# Patient Record
Sex: FEMALE | Race: ASIAN | URBAN - METROPOLITAN AREA
[De-identification: names, ages, dates, MRNs, and addresses within clinical notes are randomized per-mention and may not be internally consistent; named-entity substitution may affect disease eponyms.]

---

## 2023-01-03 ENCOUNTER — OFFICE VISIT (OUTPATIENT)
Dept: INTERNAL MEDICINE CLINIC | Facility: CLINIC | Age: 58
End: 2023-01-03
Payer: MEDICAID

## 2023-01-03 VITALS
BODY MASS INDEX: 19.39 KG/M2 | HEIGHT: 61 IN | OXYGEN SATURATION: 99 % | DIASTOLIC BLOOD PRESSURE: 80 MMHG | WEIGHT: 102.69 LBS | HEART RATE: 128 BPM | SYSTOLIC BLOOD PRESSURE: 140 MMHG

## 2023-01-03 DIAGNOSIS — I10 ESSENTIAL HYPERTENSION: Primary | ICD-10-CM

## 2023-01-03 DIAGNOSIS — Z12.11 ENCOUNTER FOR FIT (FECAL IMMUNOCHEMICAL TEST) SCREENING: ICD-10-CM

## 2023-01-03 PROCEDURE — 3008F BODY MASS INDEX DOCD: CPT | Performed by: NURSE PRACTITIONER

## 2023-01-03 PROCEDURE — 3077F SYST BP >= 140 MM HG: CPT | Performed by: NURSE PRACTITIONER

## 2023-01-03 PROCEDURE — 99204 OFFICE O/P NEW MOD 45 MIN: CPT | Performed by: NURSE PRACTITIONER

## 2023-01-03 PROCEDURE — 3079F DIAST BP 80-89 MM HG: CPT | Performed by: NURSE PRACTITIONER

## 2023-01-03 RX ORDER — FELODIPINE 2.5 MG/1
2.5 TABLET, EXTENDED RELEASE ORAL DAILY
COMMUNITY
End: 2023-01-03

## 2023-01-03 RX ORDER — FELODIPINE 2.5 MG/1
2.5 TABLET, EXTENDED RELEASE ORAL DAILY
Qty: 30 TABLET | Refills: 0 | Status: SHIPPED | OUTPATIENT
Start: 2023-01-03

## 2023-01-03 RX ORDER — RAMIPRIL 10 MG/1
10 CAPSULE ORAL DAILY
COMMUNITY
End: 2023-01-03

## 2023-01-03 RX ORDER — RAMIPRIL 10 MG/1
10 CAPSULE ORAL DAILY
Qty: 30 CAPSULE | Refills: 0 | Status: SHIPPED | OUTPATIENT
Start: 2023-01-03

## 2023-01-03 NOTE — PATIENT INSTRUCTIONS
ASSESSMENT/PLAN:   Essential hypertension  (primary encounter diagnosis)  Careful with diet and excercise at least 30 minutes 3-4 times a week. Check blood pressures at different times on different days. Can purchase own blood pressure monitor. If not, check at local pharmacy. Bake foods more and grill occasionally. Avoid fried foods. No salt. Use other seasonings. ? anxiety  Call in 1 week with blood pressure and Heart rate readings      Encounter for fit (fecal immunochemical test) screening  Ordered fit test    Orders Placed This Encounter      Occult Blood, Fecal, FIT Immunoassay    Follow up with primary in 1 month or sooner if needed  Meds This Visit:  Requested Prescriptions     Signed Prescriptions Disp Refills    felodipine ER 2.5 MG Oral Tablet 24 Hr 30 tablet 0     Sig: Take 1 tablet (2.5 mg total) by mouth daily. ramipril 10 MG Oral Cap 30 capsule 0     Sig: Take 1 capsule (10 mg total) by mouth daily.        Imaging & Referrals:  None

## 2023-01-20 ENCOUNTER — LAB ENCOUNTER (OUTPATIENT)
Dept: LAB | Age: 58
End: 2023-01-20
Attending: INTERNAL MEDICINE
Payer: MEDICAID

## 2023-01-20 ENCOUNTER — OFFICE VISIT (OUTPATIENT)
Dept: INTERNAL MEDICINE CLINIC | Facility: CLINIC | Age: 58
End: 2023-01-20

## 2023-01-20 VITALS
SYSTOLIC BLOOD PRESSURE: 142 MMHG | DIASTOLIC BLOOD PRESSURE: 80 MMHG | BODY MASS INDEX: 18.31 KG/M2 | HEART RATE: 130 BPM | HEIGHT: 61 IN | WEIGHT: 97 LBS

## 2023-01-20 DIAGNOSIS — Z12.31 ENCOUNTER FOR SCREENING MAMMOGRAM FOR BREAST CANCER: ICD-10-CM

## 2023-01-20 DIAGNOSIS — Z00.00 ANNUAL PHYSICAL EXAM: Primary | ICD-10-CM

## 2023-01-20 DIAGNOSIS — Z00.00 ANNUAL PHYSICAL EXAM: ICD-10-CM

## 2023-01-20 DIAGNOSIS — Z53.20 PAPANICOLAOU SMEAR DECLINED: ICD-10-CM

## 2023-01-20 DIAGNOSIS — I10 WHITE COAT SYNDROME WITH HYPERTENSION: ICD-10-CM

## 2023-01-20 DIAGNOSIS — R74.8 ELEVATED ALKALINE PHOSPHATASE LEVEL: ICD-10-CM

## 2023-01-20 DIAGNOSIS — Z12.11 COLON CANCER SCREENING: ICD-10-CM

## 2023-01-20 LAB
ALBUMIN SERPL-MCNC: 3.8 G/DL (ref 3.4–5)
ALBUMIN/GLOB SERPL: 1 {RATIO} (ref 1–2)
ALP LIVER SERPL-CCNC: 120 U/L
ALT SERPL-CCNC: 19 U/L
ANION GAP SERPL CALC-SCNC: 7 MMOL/L (ref 0–18)
AST SERPL-CCNC: 15 U/L (ref 15–37)
BASOPHILS # BLD AUTO: 0.02 X10(3) UL (ref 0–0.2)
BASOPHILS NFR BLD AUTO: 0.3 %
BILIRUB SERPL-MCNC: 0.5 MG/DL (ref 0.1–2)
BUN BLD-MCNC: 14 MG/DL (ref 7–18)
BUN/CREAT SERPL: 24.6 (ref 10–20)
CALCIUM BLD-MCNC: 8.9 MG/DL (ref 8.5–10.1)
CHLORIDE SERPL-SCNC: 108 MMOL/L (ref 98–112)
CHOLEST SERPL-MCNC: 153 MG/DL (ref ?–200)
CO2 SERPL-SCNC: 27 MMOL/L (ref 21–32)
CREAT BLD-MCNC: 0.57 MG/DL
DEPRECATED RDW RBC AUTO: 44.3 FL (ref 35.1–46.3)
EOSINOPHIL # BLD AUTO: 0.03 X10(3) UL (ref 0–0.7)
EOSINOPHIL NFR BLD AUTO: 0.5 %
ERYTHROCYTE [DISTWIDTH] IN BLOOD BY AUTOMATED COUNT: 13.6 % (ref 11–15)
FASTING PATIENT LIPID ANSWER: YES
FASTING STATUS PATIENT QL REPORTED: YES
GFR SERPLBLD BASED ON 1.73 SQ M-ARVRAT: 105 ML/MIN/1.73M2 (ref 60–?)
GLOBULIN PLAS-MCNC: 3.8 G/DL (ref 2.8–4.4)
GLUCOSE BLD-MCNC: 95 MG/DL (ref 70–99)
HCT VFR BLD AUTO: 37.4 %
HDLC SERPL-MCNC: 68 MG/DL (ref 40–59)
HGB BLD-MCNC: 12.1 G/DL
IMM GRANULOCYTES # BLD AUTO: 0.01 X10(3) UL (ref 0–1)
IMM GRANULOCYTES NFR BLD: 0.2 %
LDLC SERPL CALC-MCNC: 68 MG/DL (ref ?–100)
LYMPHOCYTES # BLD AUTO: 2.58 X10(3) UL (ref 1–4)
LYMPHOCYTES NFR BLD AUTO: 40 %
MCH RBC QN AUTO: 28.9 PG (ref 26–34)
MCHC RBC AUTO-ENTMCNC: 32.4 G/DL (ref 31–37)
MCV RBC AUTO: 89.5 FL
MONOCYTES # BLD AUTO: 0.49 X10(3) UL (ref 0.1–1)
MONOCYTES NFR BLD AUTO: 7.6 %
NEUTROPHILS # BLD AUTO: 3.32 X10 (3) UL (ref 1.5–7.7)
NEUTROPHILS # BLD AUTO: 3.32 X10(3) UL (ref 1.5–7.7)
NEUTROPHILS NFR BLD AUTO: 51.4 %
NONHDLC SERPL-MCNC: 85 MG/DL (ref ?–130)
OSMOLALITY SERPL CALC.SUM OF ELEC: 294 MOSM/KG (ref 275–295)
PLATELET # BLD AUTO: 298 10(3)UL (ref 150–450)
POTASSIUM SERPL-SCNC: 3.1 MMOL/L (ref 3.5–5.1)
PROT SERPL-MCNC: 7.6 G/DL (ref 6.4–8.2)
RBC # BLD AUTO: 4.18 X10(6)UL
SODIUM SERPL-SCNC: 142 MMOL/L (ref 136–145)
T3FREE SERPL-MCNC: 3.76 PG/ML (ref 2.4–4.2)
T4 FREE SERPL-MCNC: 1.3 NG/DL (ref 0.8–1.7)
TRIGL SERPL-MCNC: 90 MG/DL (ref 30–149)
TSI SER-ACNC: <0.005 MIU/ML (ref 0.36–3.74)
VLDLC SERPL CALC-MCNC: 14 MG/DL (ref 0–30)
WBC # BLD AUTO: 6.5 X10(3) UL (ref 4–11)

## 2023-01-20 PROCEDURE — 84439 ASSAY OF FREE THYROXINE: CPT

## 2023-01-20 PROCEDURE — 82977 ASSAY OF GGT: CPT

## 2023-01-20 PROCEDURE — 99396 PREV VISIT EST AGE 40-64: CPT | Performed by: INTERNAL MEDICINE

## 2023-01-20 PROCEDURE — 80061 LIPID PANEL: CPT

## 2023-01-20 PROCEDURE — 80053 COMPREHEN METABOLIC PANEL: CPT

## 2023-01-20 PROCEDURE — 3008F BODY MASS INDEX DOCD: CPT | Performed by: INTERNAL MEDICINE

## 2023-01-20 PROCEDURE — 84443 ASSAY THYROID STIM HORMONE: CPT

## 2023-01-20 PROCEDURE — 84481 FREE ASSAY (FT-3): CPT

## 2023-01-20 PROCEDURE — 36415 COLL VENOUS BLD VENIPUNCTURE: CPT

## 2023-01-20 PROCEDURE — 85025 COMPLETE CBC W/AUTO DIFF WBC: CPT

## 2023-01-20 PROCEDURE — 3079F DIAST BP 80-89 MM HG: CPT | Performed by: INTERNAL MEDICINE

## 2023-01-20 PROCEDURE — 3077F SYST BP >= 140 MM HG: CPT | Performed by: INTERNAL MEDICINE

## 2023-01-20 RX ORDER — AMLODIPINE BESYLATE 2.5 MG/1
2.5 TABLET ORAL DAILY
Qty: 90 TABLET | Refills: 1 | Status: SHIPPED | OUTPATIENT
Start: 2023-01-20

## 2023-01-20 RX ORDER — LISINOPRIL 10 MG/1
10 TABLET ORAL DAILY
Qty: 90 TABLET | Refills: 1 | Status: SHIPPED | OUTPATIENT
Start: 2023-01-20

## 2023-01-22 LAB — GGT SERPL-CCNC: 11 U/L

## 2023-01-25 ENCOUNTER — TELEPHONE (OUTPATIENT)
Dept: INTERNAL MEDICINE CLINIC | Facility: CLINIC | Age: 58
End: 2023-01-25

## 2023-01-25 DIAGNOSIS — R79.89 LOW TSH LEVEL: ICD-10-CM

## 2023-01-25 DIAGNOSIS — R74.8 ELEVATED ALKALINE PHOSPHATASE LEVEL: Primary | ICD-10-CM

## 2023-01-30 ENCOUNTER — TELEPHONE (OUTPATIENT)
Dept: INTERNAL MEDICINE CLINIC | Facility: CLINIC | Age: 58
End: 2023-01-30

## 2023-01-30 NOTE — TELEPHONE ENCOUNTER
Spoke with Juancarlos Antunez at [facility].  Advised Ramipril was discontinued at [address] on 1/20/23 by Dr. Telma Salmon.

## 2023-01-30 NOTE — TELEPHONE ENCOUNTER
Pharmacy states that patient just started taking Ramipril she picked up presciption today. She is also taking Lisinopril. . Does Dr Leah Bang want patient to take both medicines. Please call back to confirm.  [phone]

## 2023-03-08 ENCOUNTER — LAB ENCOUNTER (OUTPATIENT)
Dept: LAB | Age: 58
End: 2023-03-08
Attending: INTERNAL MEDICINE
Payer: MEDICAID

## 2023-03-08 DIAGNOSIS — R74.8 ELEVATED ALKALINE PHOSPHATASE LEVEL: ICD-10-CM

## 2023-03-08 DIAGNOSIS — R79.89 LOW TSH LEVEL: ICD-10-CM

## 2023-03-08 LAB
ALP LIVER SERPL-CCNC: 134 U/L
PTH-INTACT SERPL-MCNC: 85 PG/ML (ref 18.5–88)
T4 FREE SERPL-MCNC: 1.3 NG/DL (ref 0.8–1.7)
TSI SER-ACNC: <0.005 MIU/ML (ref 0.36–3.74)
VIT D+METAB SERPL-MCNC: 24.4 NG/ML (ref 30–100)

## 2023-03-08 PROCEDURE — 82306 VITAMIN D 25 HYDROXY: CPT

## 2023-03-08 PROCEDURE — 84443 ASSAY THYROID STIM HORMONE: CPT

## 2023-03-08 PROCEDURE — 84439 ASSAY OF FREE THYROXINE: CPT

## 2023-03-08 PROCEDURE — 83970 ASSAY OF PARATHORMONE: CPT

## 2023-03-08 PROCEDURE — 84075 ASSAY ALKALINE PHOSPHATASE: CPT

## 2023-03-08 PROCEDURE — 36415 COLL VENOUS BLD VENIPUNCTURE: CPT

## 2023-03-12 ENCOUNTER — TELEPHONE (OUTPATIENT)
Dept: INTERNAL MEDICINE CLINIC | Facility: CLINIC | Age: 58
End: 2023-03-12

## 2023-03-12 DIAGNOSIS — R79.89 LOW TSH LEVEL: ICD-10-CM

## 2023-03-12 DIAGNOSIS — R74.8 ELEVATED ALKALINE PHOSPHATASE LEVEL: Primary | ICD-10-CM

## 2023-03-12 DIAGNOSIS — E55.9 VITAMIN D DEFICIENCY: ICD-10-CM

## 2023-08-01 NOTE — TELEPHONE ENCOUNTER
Last visit 1/20/23; No future appointments. Cannonball message sent. CSS=please asssits for 6 month follow up. Please review; protocol failed/no protocol. Requested Prescriptions   Pending Prescriptions Disp Refills    LISINOPRIL 10 MG Oral Tab [Pharmacy Med Name: Lisinopril 10 Mg Tab Solc] 90 tablet 0     Sig: Take 1 tablet (10 mg total) by mouth daily. Hypertensive Medications Protocol Failed - 7/30/2023  8:44 PM        Failed - Last BP reading less than 140/90     BP Readings from Last 1 Encounters:  01/20/23 : 142/80              Failed - CMP or BMP in past 6 months     No results found for this or any previous visit (from the past 4392 hour(s)). Failed - In person appointment or virtual visit in the past 6 months     Recent Outpatient Visits              6 months ago Annual physical exam    Celina Bull MD    Office Visit    6 months ago Essential hypertension    [address], [place]    Office Visit                      Passed - In person appointment in the past 12 or next 3 months     Recent Outpatient Visits              6 months ago Annual physical exam    Celina Bull MD    Office Visit    6 months ago Essential hypertension    [address], [place]    Office Visit                      Passed - EGFRCR or GFRNAA > 50     GFR Evaluation  EGFRCR: 105 , resulted on 1/20/2023            AMLODIPINE 2.5 MG Oral Tab [Pharmacy Med Name: Amlodipine Besylate 2.5 Mg Tab Asce] 90 tablet 0     Sig: Take 1 tablet (2.5 mg total) by mouth daily.        Hypertensive Medications Protocol Failed - 7/30/2023  8:44 PM        Failed - Last BP reading less than 140/90     BP Readings from Last 1 Encounters:  01/20/23 : 142/80              Failed - CMP or BMP in past 6 months No results found for this or any previous visit (from the past 4392 hour(s)).             Failed - In person appointment or virtual visit in the past 6 months     Recent Outpatient Visits              6 months ago Annual physical exam    [address], [address], Tiana Valencia MD    Office Visit    6 months ago Essential hypertension    [address], [address], Herbert Arias, MEGHAN    Office Visit                      Passed - In person appointment in the past 12 or next 3 months     Recent Outpatient Visits              6 months ago Annual physical exam    [facility], Tiana Valencia MD    Office Visit    6 months ago Essential hypertension    [address], [address], [place] Enriquez    Office Visit                      Passed - EGFRCR or GFRNAA > 50     GFR Evaluation  EGFRCR: 105 , resulted on 1/20/2023                Recent Outpatient Visits              6 months ago Annual physical exam    [address], [address], Tiana Valencia MD    Office Visit    6 months ago Essential hypertension    [address], [address], [address], APRN    Office Visit

## 2023-08-02 RX ORDER — LISINOPRIL 10 MG/1
10 TABLET ORAL DAILY
Qty: 90 TABLET | Refills: 0 | Status: SHIPPED | OUTPATIENT
Start: 2023-08-02

## 2023-08-02 RX ORDER — AMLODIPINE BESYLATE 2.5 MG/1
2.5 TABLET ORAL DAILY
Qty: 90 TABLET | Refills: 0 | Status: SHIPPED | OUTPATIENT
Start: 2023-08-02

## 2023-09-06 ENCOUNTER — OFFICE VISIT (OUTPATIENT)
Dept: INTERNAL MEDICINE CLINIC | Facility: CLINIC | Age: 58
End: 2023-09-06

## 2023-09-06 ENCOUNTER — LAB ENCOUNTER (OUTPATIENT)
Dept: LAB | Age: 58
End: 2023-09-06
Attending: INTERNAL MEDICINE
Payer: MEDICAID

## 2023-09-06 VITALS
HEART RATE: 94 BPM | SYSTOLIC BLOOD PRESSURE: 134 MMHG | BODY MASS INDEX: 17.82 KG/M2 | DIASTOLIC BLOOD PRESSURE: 80 MMHG | OXYGEN SATURATION: 97 % | WEIGHT: 94.38 LBS | TEMPERATURE: 99 F | HEIGHT: 61 IN

## 2023-09-06 DIAGNOSIS — E55.9 VITAMIN D DEFICIENCY: ICD-10-CM

## 2023-09-06 DIAGNOSIS — Z53.20 PAPANICOLAOU SMEAR DECLINED: ICD-10-CM

## 2023-09-06 DIAGNOSIS — I10 WHITE COAT SYNDROME WITH HYPERTENSION: Primary | ICD-10-CM

## 2023-09-06 DIAGNOSIS — R74.8 ELEVATED ALKALINE PHOSPHATASE LEVEL: ICD-10-CM

## 2023-09-06 DIAGNOSIS — Z12.31 ENCOUNTER FOR SCREENING MAMMOGRAM FOR BREAST CANCER: ICD-10-CM

## 2023-09-06 DIAGNOSIS — R79.89 LOW TSH LEVEL: ICD-10-CM

## 2023-09-06 DIAGNOSIS — Z12.11 COLON CANCER SCREENING: ICD-10-CM

## 2023-09-06 LAB
ALP LIVER SERPL-CCNC: 115 U/L
T3FREE SERPL-MCNC: 3.66 PG/ML (ref 2.4–4.2)
T4 FREE SERPL-MCNC: 1.3 NG/DL (ref 0.8–1.7)
TSI SER-ACNC: <0.005 MIU/ML (ref 0.36–3.74)
VIT D+METAB SERPL-MCNC: 41.7 NG/ML (ref 30–100)

## 2023-09-06 PROCEDURE — 84443 ASSAY THYROID STIM HORMONE: CPT

## 2023-09-06 PROCEDURE — 3008F BODY MASS INDEX DOCD: CPT | Performed by: INTERNAL MEDICINE

## 2023-09-06 PROCEDURE — 36415 COLL VENOUS BLD VENIPUNCTURE: CPT

## 2023-09-06 PROCEDURE — 84481 FREE ASSAY (FT-3): CPT

## 2023-09-06 PROCEDURE — 82306 VITAMIN D 25 HYDROXY: CPT

## 2023-09-06 PROCEDURE — 84439 ASSAY OF FREE THYROXINE: CPT

## 2023-09-06 PROCEDURE — 84075 ASSAY ALKALINE PHOSPHATASE: CPT

## 2023-09-06 PROCEDURE — 3075F SYST BP GE 130 - 139MM HG: CPT | Performed by: INTERNAL MEDICINE

## 2023-09-06 PROCEDURE — 3079F DIAST BP 80-89 MM HG: CPT | Performed by: INTERNAL MEDICINE

## 2023-09-06 PROCEDURE — 99214 OFFICE O/P EST MOD 30 MIN: CPT | Performed by: INTERNAL MEDICINE

## 2023-09-06 NOTE — PROGRESS NOTES
Subjective:     Patient ID: Miranda uBckley is a 59year old female. Patient here for ffup of hypertension, also for ffup of elevated alk phos. Her GGT was normal so elevated alk phos of bone origin. Her vit D was low and her TSH was low with normal Free T4 so likely subclinical hyperthyroidism, both of which can be potential cause for elevated bone alk phos . Hypertension  This is a chronic problem. The current episode started more than 1 year ago. The problem has been gradually improving since onset. The problem is controlled. Pertinent negatives include no chest pain, peripheral edema or shortness of breath. There are no associated agents to hypertension. Past treatments include ACE inhibitors, calcium channel blockers and lifestyle changes. The current treatment provides significant improvement. There are no compliance problems. There is no history of angina, kidney disease, CAD/MI, CVA, heart failure or PVD. Identifiable causes of hypertension include a thyroid problem. There is no history of chronic renal disease or a hypertension causing med. History/Other:   Review of Systems   Constitutional: Negative. Respiratory: Negative. Negative for shortness of breath. Cardiovascular: Negative. Negative for chest pain. Gastrointestinal: Negative. Genitourinary: Negative. Hematological: Negative. Current Outpatient Medications   Medication Sig Dispense Refill    lisinopril 10 MG Oral Tab Take 1 tablet (10 mg total) by mouth daily. 90 tablet 0    amLODIPine 2.5 MG Oral Tab Take 1 tablet (2.5 mg total) by mouth daily. 90 tablet 0     Allergies:No Known Allergies    Past Medical History:   Diagnosis Date    Essential hypertension       History reviewed. No pertinent surgical history.    Family History   Problem Relation Age of Onset    Hypertension Father     Diabetes Father     Hypertension Mother     Diabetes Mother       Social History:   Social History     Socioeconomic History    Marital status: Single   Tobacco Use    Smoking status: Never     Passive exposure: Never    Smokeless tobacco: Never   Vaping Use    Vaping Use: Never used   Substance and Sexual Activity    Alcohol use: Never    Drug use: Never        Objective:   Physical Exam  Constitutional:       General: She is not in acute distress. Appearance: She is well-developed. She is not ill-appearing, toxic-appearing or diaphoretic. HENT:      Head: Normocephalic and atraumatic. Right Ear: Tympanic membrane, ear canal and external ear normal.      Left Ear: Tympanic membrane, ear canal and external ear normal.      Nose: Nose normal.      Mouth/Throat:      Pharynx: No oropharyngeal exudate. Eyes:      General:         Right eye: No discharge. Left eye: No discharge. Conjunctiva/sclera: Conjunctivae normal.      Pupils: Pupils are equal, round, and reactive to light. Neck:      Vascular: No JVD. Cardiovascular:      Rate and Rhythm: Normal rate and regular rhythm. Heart sounds: Normal heart sounds. Pulmonary:      Effort: Pulmonary effort is normal. No respiratory distress. Breath sounds: Normal breath sounds. No wheezing or rales. Abdominal:      General: Bowel sounds are normal. There is no distension. Palpations: Abdomen is soft. There is no mass. Tenderness: There is no abdominal tenderness. There is no guarding or rebound. Musculoskeletal:         General: No tenderness. Normal range of motion. Cervical back: Normal range of motion and neck supple. No rigidity or tenderness. Right lower leg: No edema. Left lower leg: No edema. Lymphadenopathy:      Cervical: No cervical adenopathy. Skin:     General: Skin is warm and dry. Coloration: Skin is not jaundiced or pale. Findings: No rash. Neurological:      Mental Status: She is alert and oriented to person, place, and time.          Assessment & Plan:   (I10) White coat syndrome with hypertension  (primary encounter diagnosis)  Plan: bp had been elevated whenever she is in any clinic even back when she as still living in [facility] before. Her bp at home had been in the 120's/70's per pt so bp controlled and she does have component of whitecoat above her hypertension .    (R74.8) Elevated alkaline phosphatase level  Plan: ALKALINE PHOSPHATASE, S        D/w pt, will recheck vit D and her TFT.     (Z12.31) Encounter for screening mammogram for breast cancer  Plan: [facility] K[facility] 2D+3D SCREENING BILAT         (CPT=77067/16573)        Order for mammogram .    (R79.89) Low TSH level  Plan: FREE T4 (FREE THYROXINE), FREE T3         (TRIIODOTHYRONINE), ASSAY, THYROID STIM HORMONE        Recheck TFT.     (E55.9) Vitamin D deficiency  Plan: VITAMIN D        Check vit D. She is taking vit D3 at 1k daily.     (Z12.11) Colon cancer screening  Plan: OCCULT BLOOD, FECAL, FIT IMMUNOASSAY        Pt again reminded to do her fit stool test; another testing kit given . (Z53.20) Papanicolaou smear declined  Plan: pt continues to decline papsmear.          Orders Placed This Encounter      Free T4, (Free Thyroxine)      Free T3 (Triiodothryronine)      Assay, Thyroid Stim Hormone      Alkaline Phosphatase, S [E]      Vitamin D [E]      Occult Blood, Fecal, FIT Immunoassay      Meds This Visit:  Requested Prescriptions      No prescriptions requested or ordered in this encounter       Imaging & Referrals:  [facility] 2D+3D SCREENING BILAT (CPT=77067/96840)

## 2023-10-26 RX ORDER — LISINOPRIL 10 MG/1
10 TABLET ORAL DAILY
Qty: 90 TABLET | Refills: 0 | Status: SHIPPED | OUTPATIENT
Start: 2023-10-26

## 2023-10-26 RX ORDER — AMLODIPINE BESYLATE 2.5 MG/1
2.5 TABLET ORAL DAILY
Qty: 90 TABLET | Refills: 0 | Status: SHIPPED | OUTPATIENT
Start: 2023-10-26

## 2023-10-26 NOTE — TELEPHONE ENCOUNTER
Please review; protocol failed. No active /future labs noted     Requested Prescriptions   Pending Prescriptions Disp Refills    LISINOPRIL 10 MG Oral Tab [Pharmacy Med Name: Lisinopril 10 Mg Tab Lupi] 90 tablet 0     Sig: Take 1 tablet (10 mg total) by mouth daily. Hypertensive Medications Protocol Failed - 10/24/2023  6:04 PM        Failed - CMP or BMP in past 6 months     No results found for this or any previous visit (from the past 4392 hour(s)). Passed - In person appointment in the past 12 or next 3 months     Recent Outpatient Visits              1 month ago White coat syndrome with hypertension    [facility], [address], Dinora Sánchez MD    Office Visit    9 months ago Annual physical exam    [facility], Dinora Sánchez MD    Office Visit    9 months ago Essential hypertension    [address], [address], Herbert Beaulieu, ME[place]AN    Office Visit                      Passed - Last BP reading less than 140/90     BP Readings from Last 1 Encounters:  23 : 134/80              Passed - In person appointment or virtual visit in the past 6 months     Recent Outpatient Visits              1 month ago White coat syndrome with hypertension    [address], [address], Dinora Sánchez MD    Office Visit    9 months ago Annual physical exam    [address], [address], Dinora Sánchez MD    Office Visit    9 months ago Essential hypertension    [address], [address], Herbert Beaul[place], APRGINGER    Office Visit                      Passed - EGFRCR or GFRNAA > 50     GFR Evaluation  EGFRCR: 105 , resulted on 2023            AMLODIPINE 2.5 MG Oral Tab [Pharmacy Med Name: Amlodipine Besylate 2.5 Mg Tab Asce] 90 tablet 0     Sig: Take 1 tablet (2.5 mg total) by mouth daily.        Hypertensive Medications Protocol Failed - 10/24/2023  6:04 PM        Failed - CMP or BMP in past 6 months     No results found for this or any previous visit (from the past 4392 hour(s)).             Passed - In person appointment in the past 12 or next 3 months     Recent Outpatient Visits              1 month ago White coat syndrome with hypertension    [facility], [address], Alysia Woo MD    Office Visit    9 months [facility]    Office Visit    9 months ago Essential hypertension    [address], [address], [place]    Office Visit                      Passed - Last BP reading less than 140/90     BP Readings from Last 1 Encounters:  09/06/23 : 134/80              Passed - In person appointment or virtual visit in the past 6 months     Recent Outpatient Visits              1 month ago White coat syndrome with hypertension    [facility], [address], Alysia Woo MD    Office Visit    9 months ago [facility]    Office Visit    9 months ago Essential hypertension    [address], [address], [place]    Office Visit                      Passed - EGFRCR or GFRNAA > 50     GFR Evaluation  EGFRCR: 105 , resulted on 1/20/2023             Recent Outpatient Visits              1 month ago White coat syndrome with hypertension    [address], [address], Alysia Woo MD    Office Visit    9 months ago Annual physical exam    [address], [address], Alysia Woo MD    Office Visit    9 months ago Essential hypertension    [address], [address], [address], APRN    Office Visit DISPLAY PLAN FREE TEXT

## 2024-01-10 RX ORDER — AMLODIPINE BESYLATE 2.5 MG/1
2.5 TABLET ORAL DAILY
Qty: 90 TABLET | Refills: 0 | Status: SHIPPED | OUTPATIENT
Start: 2024-01-10

## 2024-01-10 RX ORDER — LISINOPRIL 10 MG/1
10 TABLET ORAL DAILY
Qty: 90 TABLET | Refills: 0 | Status: SHIPPED | OUTPATIENT
Start: 2024-01-10

## 2024-01-10 NOTE — TELEPHONE ENCOUNTER
Please review. Protocol failed / No Protocol.    Requested Prescriptions   Pending Prescriptions Disp Refills    LISINOPRIL 10 MG Oral Tab [Pharmacy Med Name: Lisinopril 10 Mg Tab Lupi] 90 tablet 0     Sig: Take 1 tablet (10 mg total) by mouth daily.       Hypertensive Medications Protocol Failed - 1/8/2024 12:41 PM        Failed - CMP or BMP in past 6 months     No results found for this or any previous visit (from the past 4392 hour(s)).            Passed - In person appointment in the past 12 or next 3 months     Recent Outpatient Visits              4 months ago White coat syndrome with hypertension    [facility] Chris Navarro MD    Office Visit    11 months ago Annual physical exam    [facility] Chris Navarro MD    Office Visit    1 year ago Essential hypertension    [facility], Lizzy Brambila APRN    Office Visit                      Passed - Last BP reading less than 140/90     BP Readings from Last 1 Encounters:   09/06/23 134/80               Passed - In person appointment or virtual visit in the past 6 months     Recent Outpatient Visits              4 months ago White coat syndrome with hypertension    [facility] Chris Navarro MD    Office Visit    11 months ago Annual physical exam    [facility] Chris Navarro MD    Office Visit    1 year ago Essential hypertension    [facility], Lizzy Brambila APRN    Office Visit                      Passed - EGFRCR or GFRNAA > 50     GFR Evaluation  EGFRCR: 105 , resulted on 1/20/2023            AMLODIPINE 2.5 MG Oral Tab [Pharmacy Med Name: Amlodipine Besylate 2.5 Mg Tab Asce] 90 tablet 0     Sig: Take 1 tablet (2.5 mg total) by mouth daily.       Hypertensive Medications Protocol Failed -  1/8/2024 12:41 PM        Failed - CMP or BMP in past 6 months     No results found for this or any previous visit (from the past 4392 hour(s)).            Passed - In person appointment in the past 12 or next 3 months     Recent Outpatient Visits              4 months ago White coat syndrome with hypertension    [facility] Chris Navarro MD    Office Visit    11 months [facility]    [facility]    [facility]    [facility] Visit                      Passed - Last BP reading less than 140/90     BP Readings from Last 1 Encounters:   09/06/23 134/80               Passed - In person appointment or virtual visit in the past 6 months     Recent Outpatient Visits              4 months ago White coat syndrome with hypertension    [facility] Chris Navarro MD    Office Visit    11 months ago Annual physical exam    [facility] Chirs Navarro MD    Office Visit    1 year ago [facility]ential hypertension    [facility]    Office Visit                      Passed - EGFRCR or GFRNAA > 50     GFR Evaluation  EGFRCR: 105 , resulted on 1/20/2023

## 2024-04-13 RX ORDER — LISINOPRIL 10 MG/1
10 TABLET ORAL DAILY
Qty: 90 TABLET | Refills: 0 | Status: SHIPPED | OUTPATIENT
Start: 2024-04-13

## 2024-04-13 RX ORDER — AMLODIPINE BESYLATE 2.5 MG/1
2.5 TABLET ORAL DAILY
Qty: 90 TABLET | Refills: 0 | Status: SHIPPED | OUTPATIENT
Start: 2024-04-13

## 2024-04-13 NOTE — TELEPHONE ENCOUNTER
Please review. Protocol failed/ No protocol.    Requested Prescriptions   Pending Prescriptions Disp Refills    LISINOPRIL 10 MG Oral Tab [Pharmacy Med Name: Lisinopril 10 Mg Tab Lupi] 90 tablet 0     Sig: Take 1 tablet (10 mg total) by mouth daily.       Hypertension Medications Protocol Failed - 4/12/2024 11:14 PM        Failed - CMP or BMP in past 12 months        Failed - EGFRCR or GFRNAA > 50     GFR Evaluation            Passed - Last BP reading less than 140/90     BP Readings from Last 1 Encounters:   09/06/23 134/80               Passed - In person appointment or virtual visit in the past 12 mos or appointment in next 3 mos     Recent Outpatient Visits              7 months ago White coat syndrome with hypertension    [facility], Chris Navarro MD    Office Visit    1 year ago Annual physical exam    [facility] Chris Navarro MD    Office Visit    1 year ago Essential hypertension    [facility], Lizzy Brambila APRN    Office Visit                        AMLODIPINE 2.5 MG Oral Tab [Pharmacy Med Name: Amlodipine Besylate 2.5 Mg Tab Asce] 90 tablet 0     Sig: Take 1 tablet (2.5 mg total) by mouth daily.       Hypertension Medications Protocol Failed - 4/12/2024 11:14 PM        Failed - CMP or BMP in past 12 months        Failed - EGFRCR or GFRNAA > 50     GFR Evaluation            Passed - Last BP reading less than 140/90     BP Readings from Last 1 Encounters:   09/06/23 134/80               Passed - In person appointment or virtual visit in the past 12 mos or appointment in next 3 mos     Recent Outpatient Visits              7 months ago White coat syndrome with hypertension    [facility], Chris Navarro MD    Office Visit    1 year ago [facility] physical exam    [facility]    Office Visit    1 year ago Essential hypertension    [facility], Lizzy Brambila APRN    Office Visit

## 2024-07-28 RX ORDER — AMLODIPINE BESYLATE 2.5 MG/1
2.5 TABLET ORAL DAILY
Qty: 90 TABLET | Refills: 0 | Status: SHIPPED | OUTPATIENT
Start: 2024-07-28 | End: 2024-10-09

## 2024-07-28 RX ORDER — LISINOPRIL 10 MG/1
10 TABLET ORAL DAILY
Qty: 90 TABLET | Refills: 0 | Status: SHIPPED | OUTPATIENT
Start: 2024-07-28 | End: 2024-10-09

## 2024-07-28 NOTE — TELEPHONE ENCOUNTER
Please review; protocol failed/ has no protocol    Requested Prescriptions   Pending Prescriptions Disp Refills    LISINOPRIL 10 MG Oral Tab [Pharmacy Med Name: Lisinopril 10 Mg Tab Lupi] 90 tablet 0     Sig: Take 1 tablet (10 mg total) by mouth daily.       Hypertension Medications Protocol Failed - 7/24/2024 11:05 PM        Failed - CMP or BMP in past 12 months        Failed - EGFRCR or GFRNAA > 50     GFR Evaluation            Passed - Last BP reading less than 140/90     BP Readings from Last 1 Encounters:   09/06/23 134/80               Passed - In person appointment or virtual visit in the past 12 mos or appointment in next 3 mos     Recent Outpatient Visits              10 months ago White coat syndrome with hypertension    [facility], Chris Navarro MD    Office Visit    1 year ago Annual physical exam    [facility] Chris Navarro MD    Office Visit    1 year ago [facility]ential hypertension    [facility]    [facility]                        AMLODIPINE 2.5 MG Oral Tab [Pharmacy Med Name: Amlodipine Besylate 2.5 Mg Tab Asce] 90 tablet 0     Sig: TAKE ONE TABLET BY MOUTH DAILY       Hypertension Medications Protocol Failed - 7/24/2024 11:05 PM        Failed - CMP or BMP in past 12 months        Failed - EGFRCR or GFRNAA > 50     GFR Evaluation            Passed - Last BP reading less than 140/90     BP Readings from Last 1 Encounters:   09/06/23 134/80               Passed - In person appointment or virtual visit in the past 12 mos or appointment in next 3 mos     Recent Outpatient Visits              10 months ago [facility] coat syndrome with hypertension    [facility]    [facility]    1 year ago [facility]     Office Visit    1 year ago Essential hypertension    [facility], Lizzy Brambila APRN    Office Visit                         Recent Outpatient Visits              10 months ago White coat syndrome with hypertension    [facility], Chris Navarro MD    Office Visit    1 year ago Annual physical exam    [facility], Chris Navarro MD    Office Visit    1 year ago Essential hypertension    [facility], Lizzy Brambila APRN    Office Visit

## 2024-10-09 RX ORDER — LISINOPRIL 10 MG/1
10 TABLET ORAL DAILY
Qty: 30 TABLET | Refills: 0 | Status: SHIPPED | OUTPATIENT
Start: 2024-10-09 | End: 2024-11-20

## 2024-10-09 RX ORDER — AMLODIPINE BESYLATE 2.5 MG/1
2.5 TABLET ORAL DAILY
Qty: 30 TABLET | Refills: 0 | Status: SHIPPED | OUTPATIENT
Start: 2024-10-09 | End: 2024-11-22

## 2024-10-09 NOTE — TELEPHONE ENCOUNTER
Please Review. Protocol Failed; No Protocol   Mycharted patient to schedule an appointment.     Requested Prescriptions   Pending Prescriptions Disp Refills    LISINOPRIL 10 MG Oral Tab [Pharmacy Med Name: Lisinopril 10 Mg Tab Lupi] 90 tablet 0     Sig: TAKE ONE TABLET BY MOUTH ONCE DAILY       Hypertension Medications Protocol Failed - 10/9/2024 11:46 AM        Failed - CMP or BMP in past 12 months        Failed - In person appointment or virtual visit in the past 12 mos or appointment in next 3 mos     Recent Outpatient Visits              1 year ago White coat syndrome with hypertension    [facility] Chris Navarro MD    Office Visit    1 year ago [facility]    Office Visit    1 year ago Essential hypertension    [facility], Lizzy Brambila APRN    Office Visit                      Failed - EGFRCR or GFRNAA > 50     GFR Evaluation            Passed - Last BP reading less than 140/90     BP Readings from Last 1 Encounters:   09/06/23 134/80                 AMLODIPINE 2.5 MG Oral Tab [Pharmacy Med Name: Amlodipine Besylate 2.5 Mg Tab Asce] 90 tablet 0     Sig: TAKE ONE TABLET BY MOUTH ONCE DAILY       Hypertension Medications Protocol Failed - 10/9/2024 11:46 AM        Failed - CMP or BMP in past 12 months        Failed - In person appointment or virtual visit in the past 12 mos or appointment in next 3 mos     Recent Outpatient Visits              1 year ago White coat syndrome with hypertension    [facility]    [facility]    1 year ago Annual physical exam    [facility] Chris Navarro MD    Office Visit    1 year ago Essential hypertension    [facility], Lizzy Brambila APRN    Office  Visit                      Failed - EGFRCR or GFRNAA > 50     GFR Evaluation            Passed - Last BP reading less than 140/90     BP Readings from Last 1 Encounters:   09/06/23 134/80                        Recent Outpatient Visits              1 year ago [facility] coat syndrome with hypertension    [facility]    Office Visit    1 year ago Annual physical exam    [facility] Magdaleno Emmanuel, MD    Office Visit    1 year ago Essential hypertension    [facility]    [facility] Visit

## 2024-11-19 NOTE — TELEPHONE ENCOUNTER
Please review; protocol failed/ has no protocol      No active /future labs noted   Message sent for patient to make an appointment.     Requested Prescriptions   Pending Prescriptions Disp Refills    LISINOPRIL 10 MG Oral Tab [Pharmacy Med Name: Lisinopril 10 Mg Tab Lupi] 30 tablet 0     Sig: TAKE ONE TABLET BY MOUTH DAILY. make appointment for further refills.       Hypertension Medications Protocol Failed - 11/19/2024  9:02 AM        Failed - CMP or BMP in past 12 months        Failed - In person appointment or virtual visit in the past 12 mos or appointment in next 3 mos     Recent Outpatient Visits              [facility]    [facility]    [facility]    [facility] year [facility]    [facility] Visit                      Failed - EGFRCR or GFRNAA > 50     GFR Evaluation            Passed - Last BP reading less than 140/90     BP Readings from Last 1 Encounters:   09/06/23 134/80                  Recent Outpatient Visits              1 year ago [facility]    [facility]    [facility] year [facility]    [facility]    1 year [facility]    [facility] Visit

## 2024-11-20 RX ORDER — LISINOPRIL 10 MG/1
10 TABLET ORAL DAILY
Qty: 30 TABLET | Refills: 0 | Status: SHIPPED | OUTPATIENT
Start: 2024-11-20 | End: 2024-12-31

## 2024-11-21 NOTE — TELEPHONE ENCOUNTER
Please review. Protocol Failed; No Protocol    No future appointments.    Routed to Patient  for assistance with appointment.     Requested Prescriptions   Pending Prescriptions Disp Refills    AMLODIPINE 2.5 MG Oral Tab [Pharmacy Med Name: Amlodipine Besylate 2.5 Mg Tab Asce] 30 tablet 0     Sig: Take 1 tablet (2.5 mg total) by mouth daily.       Hypertension Medications Protocol Failed - 11/21/2024  8:59 AM        Failed - CMP or BMP in past 12 months        Failed - In person appointment or virtual visit in the past 12 mos or appointment in next 3 mos     Recent Outpatient Visits              [facility]    [facility]    [facility]    1 year [facility]    [facility] Visit                      Failed - EGFRCR or GFRNAA > 50     GFR Evaluation            Passed - Last BP reading less than 140/90     BP Readings from Last 1 Encounters:   09/06/23 134/80                        Recent Outpatient Visits              1 year ago [facility] coat syndrome [facility]    [facility] Visit    1 year ago Annual physical exam    [facility]    [facility] Visit    1 year ago [facility]    [facility] Visit

## 2024-11-22 RX ORDER — AMLODIPINE BESYLATE 2.5 MG/1
2.5 TABLET ORAL DAILY
Qty: 30 TABLET | Refills: 0 | Status: SHIPPED | OUTPATIENT
Start: 2024-11-22

## 2024-12-30 NOTE — TELEPHONE ENCOUNTER
Please review; protocol failed/ has no protocol    No active /future labs noted   Message sent for patient to make an appointment.     Requested Prescriptions   Pending Prescriptions Disp Refills    AMLODIPINE 2.5 MG Oral Tab [Pharmacy Med Name: Amlodipine Besylate 2.5 Mg Tab Asce] 30 tablet 0     Sig: Take 1 tablet (2.5 mg total) by mouth daily.       Hypertension Medications Protocol Failed - 12/30/2024 12:11 PM        Failed - CMP or BMP in past 12 months        Failed - In person appointment or virtual visit in the past 12 mos or appointment in next 3 mos     Recent Outpatient Visits              [facility]    [facility]    [facility]    1 year ago Essential hypertension    [facility], Lizzy Brambila APRN    Office Visit                      Failed - EGFRCR or GFRNAA > 50     GFR Evaluation            Passed - Last BP reading less than 140/90     BP Readings from Last 1 Encounters:   09/06/23 134/80                 LISINOPRIL 10 MG Oral Tab [Pharmacy Med Name: Lisinopril 10 Mg Tab Lupi] 30 tablet 0     Sig: Take 1 tablet (10 mg total) by mouth daily.       Hypertension Medications Protocol Failed - 12/30/2024 12:11 PM        Failed - CMP or BMP in past 12 months        Failed - In person appointment or virtual visit in the past 12 mos or appointment in next 3 mos     Recent Outpatient Visits              1 year ago White coat syndrome with hypertension    [facility]    Office Visit    1 year ago [facility]    [facility] Visit    1 year ago Essential hypertension    [facility] Edgar Rasta, Lizzy Brambila APRN    Office Visit                      Failed - EGFRCR or GFRNAA > 50     GFR Evaluation            Passed - Last BP reading less than 140/90     BP Readings from Last 1 Encounters:   09/06/23 134/80                  Recent Outpatient Visits              1 year ago White coat syndrome with hypertension    [facility] Emmanuel, [facility]    Office Visit    1 year ago Annual physical exam    [facility] [address], Chris Navarro MD    Office Visit    1 year ago Essential hypertension    [facility], Lizzy Brambila APRN    Office Visit

## 2024-12-31 RX ORDER — LISINOPRIL 10 MG/1
10 TABLET ORAL DAILY
Qty: 30 TABLET | Refills: 0 | Status: SHIPPED | OUTPATIENT
Start: 2024-12-31 | End: 2025-02-01

## 2024-12-31 RX ORDER — AMLODIPINE BESYLATE 2.5 MG/1
2.5 TABLET ORAL DAILY
Qty: 30 TABLET | Refills: 0 | Status: SHIPPED | OUTPATIENT
Start: 2024-12-31 | End: 2025-02-01

## 2025-01-28 ENCOUNTER — TELEPHONE (OUTPATIENT)
Dept: INTERNAL MEDICINE CLINIC | Facility: CLINIC | Age: 60
End: 2025-01-28

## 2025-02-01 RX ORDER — AMLODIPINE BESYLATE 2.5 MG/1
2.5 TABLET ORAL DAILY
Qty: 30 TABLET | Refills: 0 | Status: SHIPPED | OUTPATIENT
Start: 2025-02-01

## 2025-02-01 RX ORDER — LISINOPRIL 10 MG/1
10 TABLET ORAL DAILY
Qty: 30 TABLET | Refills: 0 | Status: SHIPPED | OUTPATIENT
Start: 2025-02-01

## 2025-02-01 NOTE — TELEPHONE ENCOUNTER
Please kindly review; protocol failed or medication has no protocol attached.      Routed to Call Center to call patient and make an appointment.    Patient is overdue for an office visit - last seen 9/6/2023    Future Appointments  None     Requested Prescriptions   Pending Prescriptions Disp Refills    LISINOPRIL 10 MG Oral Tab [Pharmacy Med Name: Lisinopril 10 Mg Tab Lupi] 30 tablet 0     Sig: TAKE ONE TABLET BY MOUTH ONCE DAILY       Hypertension Medications Protocol Failed - 2/1/2025 10:54 AM        Failed - CMP or BMP in past 12 months        Failed - In person appointment or virtual visit in the past 12 mos or appointment in next 3 mos     Recent Outpatient Visits              1 year ago [facility] coat syndrome [facility]    [facility] Visit    2 years ago Annual physical exam    [facility] [place] Chris Navarro MD    Office Visit    2 years ago Essential [facility]    Office Visit                      Failed - EGFRCR or GFRNAA > 50     GFR Evaluation            Passed - Last BP reading less than 140/90     BP Readings from Last 1 Encounters:   09/06/23 134/80               Passed - Medication is active on med list          AMLODIPINE 2.5 MG Oral Tab [Pharmacy Med Name: Amlodipine Besylate 2.5 Mg Tab Asce] 30 tablet 0     Sig: TAKE ONE TABLET BY MOUTH ONCE DAILY       Hypertension Medications Protocol Failed - 2/1/2025 10:54 AM        Failed - CMP or BMP in past 12 months        Failed - In person appointment or virtual visit in the past 12 mos or appointment in next 3 mos     Recent Outpatient Visits              1 year ago White coat syndrome with hypertension    [facility], Chris Navarro MD    Office Visit    2 years ago Annual physical exam    [facility]  [facility], Chris Navarro MD    Office Visit    2 years ago Essential hypertension    [facility], Lizzy Brambila APRN    Office Visit                      Failed - EGFRCR or GFRNAA > 50     GFR Evaluation            Passed - Last BP reading less than 140/90     BP Readings from Last 1 Encounters:   09/06/23 134/80               Passed - Medication is active on med list               Recent Outpatient Visits              1 year ago White coat syndrome with hypertension    [facility] [address], Chris Navarro MD    Office Visit    2 years ago Annual physical exam    [facility]    Office Visit    2 years ago Essential hypertension    [facility], Lizzy Brambila APRN    Office Visit

## 2025-02-01 NOTE — TELEPHONE ENCOUNTER
Please call patient to assist in making an appointment. Thank you     **Please attempt all available phone numbers in patient's chart. This includes alternative numbers and contacts on patient's release of information. Thank you.  Patient is overdue for an office visit - last seen 9/6/2023

## 2025-03-10 NOTE — TELEPHONE ENCOUNTER
Please Review. Protocol Failed; No Protocol   Sent to Patient  to call patient to schedule an appointment

## 2025-03-11 RX ORDER — AMLODIPINE BESYLATE 2.5 MG/1
2.5 TABLET ORAL DAILY
Qty: 30 TABLET | Refills: 0 | OUTPATIENT
Start: 2025-03-11

## 2025-03-11 RX ORDER — LISINOPRIL 10 MG/1
10 TABLET ORAL DAILY
Qty: 30 TABLET | Refills: 0 | OUTPATIENT
Start: 2025-03-11

## 2025-04-10 NOTE — TELEPHONE ENCOUNTER
Please review. Protocol Failed; No Protocol    Future Appointments   Date Time Provider Department Center   4/24/2025 10:00 AM Chris Rosenberg MD [facility]

## 2025-04-11 RX ORDER — AMLODIPINE BESYLATE 2.5 MG/1
2.5 TABLET ORAL DAILY
Qty: 30 TABLET | Refills: 0 | Status: SHIPPED | OUTPATIENT
Start: 2025-04-11

## 2025-04-11 RX ORDER — LISINOPRIL 10 MG/1
10 TABLET ORAL DAILY
Qty: 30 TABLET | Refills: 0 | Status: SHIPPED | OUTPATIENT
Start: 2025-04-11

## 2025-05-14 RX ORDER — LISINOPRIL 10 MG/1
10 TABLET ORAL DAILY
Qty: 30 TABLET | Refills: 0 | Status: SHIPPED | OUTPATIENT
Start: 2025-05-14

## 2025-05-14 RX ORDER — AMLODIPINE BESYLATE 2.5 MG/1
2.5 TABLET ORAL DAILY
Qty: 30 TABLET | Refills: 0 | Status: SHIPPED | OUTPATIENT
Start: 2025-05-14

## 2025-05-14 NOTE — TELEPHONE ENCOUNTER
Please review.  Protocol failed/has no protocol.    Last Office Visit: 09/06/2023  Please advise if refill is appropriate.      Future Appointments   Date Time Provider Department Center   6/18/2025 11:00 AM Chris Rosenberg MD [facility]

## 2025-06-13 NOTE — TELEPHONE ENCOUNTER
Please review: medication fails/has no protocol attached.    Patient has upcoming office visit in 5 days, 6/18/25. Should refills be addressed during office visit? Patient is overdue.    Future Appointments   Date Time Provider Department Center   6/18/2025 11:00 AM Chris Rosenberg MD [facility]     Last office visit: 9/6/2023    No active/future labs pended for CMP. Last completed 1/20/2023

## 2025-06-16 RX ORDER — AMLODIPINE BESYLATE 2.5 MG/1
2.5 TABLET ORAL DAILY
Qty: 30 TABLET | Refills: 0 | Status: SHIPPED | OUTPATIENT
Start: 2025-06-16

## 2025-06-16 RX ORDER — LISINOPRIL 10 MG/1
10 TABLET ORAL DAILY
Qty: 30 TABLET | Refills: 0 | Status: SHIPPED | OUTPATIENT
Start: 2025-06-16

## 2025-07-16 ENCOUNTER — TELEPHONE (OUTPATIENT)
Dept: INTERNAL MEDICINE CLINIC | Facility: CLINIC | Age: 60
End: 2025-07-16

## 2025-07-22 RX ORDER — AMLODIPINE BESYLATE 2.5 MG/1
2.5 TABLET ORAL DAILY
Qty: 30 TABLET | Refills: 0 | OUTPATIENT
Start: 2025-07-22

## 2025-07-22 NOTE — TELEPHONE ENCOUNTER
Please review; protocol failed/No Protocol    Last Office Visit: 09/06/2023  Routed to call center to schedule an appointment

## 2025-08-21 RX ORDER — LISINOPRIL 10 MG/1
10 TABLET ORAL DAILY
Qty: 40 TABLET | Refills: 1 | Status: SHIPPED | OUTPATIENT
Start: 2025-08-21

## 2025-08-21 RX ORDER — AMLODIPINE BESYLATE 2.5 MG/1
2.5 TABLET ORAL DAILY
Qty: 40 TABLET | Refills: 1 | Status: SHIPPED | OUTPATIENT
Start: 2025-08-21

## (undated) NOTE — LETTER
7/23/2025        Jamila Rider  [address] [facility] [address]         Dear Jamila,    This letter is to inform you that our office has made several attempts to reach you by phone without success.  We were attempting to contact you by phone regarding prescription request    Please contact our office at the number listed below as soon as you receive this letter to discuss this issue and to make the necessary changes in our system to your contact information.  Thank you for your cooperation.        Sincerely,    Chris Rosenberg MD  [address] [place] [id number]  Ph: [phone]  Fax: [phone]

## (undated) NOTE — LETTER
11/25/2024    Jamila Rider  [address] [facility] [address]         Dear Jamila,    This letter is to inform you that our office has made several attempts to reach you by phone without success.  We were attempting to contact you by phone regarding prescription request    Please contact our office at the number listed below as soon as you receive this letter to discuss this issue and to make the necessary changes in our system to your contact information.  Thank you for your cooperation.        Sincerely,    Chris Rosenberg MD  [address] [place] [id number]  Ph: [phone]  Fax: [phone]         Document electronically generated by:  Ene MILES RN

## (undated) NOTE — LETTER
3/13/2025    Jamila Rider  [address] [facility] [address]         Dear Jamila,    This letter is to inform you that our office has made several attempts to reach you by phone without success.  We were attempting to contact you by phone regarding prescription request    Please contact our office at the number listed below as soon as you receive this letter to discuss this issue and to make the necessary changes in our system to your contact information.  Thank you for your cooperation.        Sincerely,    Chris Rosenberg MD  [address] [place] [id number]  Ph: [phone]  Fax: [phone]         Document electronically generated by:  Yolis BRENNAN RN